# Patient Record
Sex: MALE
[De-identification: names, ages, dates, MRNs, and addresses within clinical notes are randomized per-mention and may not be internally consistent; named-entity substitution may affect disease eponyms.]

---

## 2019-04-19 ENCOUNTER — HOSPITAL ENCOUNTER (EMERGENCY)
Dept: HOSPITAL 92 - ERS | Age: 19
Discharge: HOME | End: 2019-04-19
Payer: SELF-PAY
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
## 2019-04-19 DIAGNOSIS — T81.89XA: ICD-10-CM

## 2019-04-19 DIAGNOSIS — S56.302A: Primary | ICD-10-CM

## 2019-04-19 PROCEDURE — 29125 APPL SHORT ARM SPLINT STATIC: CPT

## 2019-04-19 NOTE — RAD
LEFT HAND THREE VIEWS:

 

INDICATIONS:

Fall with lack of sensation.  Injury.

 

FINDINGS:

There is no fracture or dislocation identified of the left hand.

 

IMPRESSION:

No acute osseous abnormality.

 

POS: C